# Patient Record
(demographics unavailable — no encounter records)

---

## 2025-03-06 NOTE — HISTORY OF PRESENT ILLNESS
[Spouse] : spouse [FreeTextEntry1] : Follow-up for atrial fibrillation, lung cancer, osteoporosis, hypertension, hyperlipidemia, anemia, and high glucose [de-identified] :  See "CC" sheet Occasional GERD, especially after eating restaurant food, salty food.    GERD  is better after taking Protonix 20 mg.  No nausea.  No vomiting.  No abdominal pain.  No melena.  No rectal bleeding.

## 2025-03-06 NOTE — ASSESSMENT
[FreeTextEntry1] : History of lung cancer.  Followed by oncologist (Jose) Atrial fibrillation.  Stable.  Followed by cardiologist (Laurel).  Continue Eliquis 5 mg twice daily.  Continue metoprolol 25 mg daily. Osteoporosis.  IV reclast in 6/2025.    DEXA in 12/25 Hypertension.  Continue losartan 25 mg daily. Hyperlipidemia.  Continue Crestor 5 mg daily.   Triglyceride, HDL, and LDL results from last week were reviewed with patient. CBC results from last week were reviewed with patient. Hemoglobin A1c result from last week was reviewed with patient. GERD.  Continue Protonix 20 mg once a day as needed.  EGD. Screening colonoscopy RX mammo ([facility]) COVID-19  booster vaccine is recommended once a year in fall. Patient understands Flu vaccine is once a year between month of October to November. Follow-up in 4 months

## 2025-03-06 NOTE — HEALTH RISK ASSESSMENT
Bupropion 150 mg 24 hr tab 1 tab by mouth daily  last filled 3/2/18  #90 1 rf  Pt last seen 8/2/18 for L elbow pain  Same meds  Keep scheduled follow up  Nov 8/15/18  Ok #90 0 rf  rx sent [facility] via e[facility]scribe   [Good] : ~his/her~  mood as  good [No] : In the past 12 months have you used drugs other than those required for medical reasons? No [No falls in past year] : Patient reported no falls in the past year [0] : 2) Feeling down, depressed, or hopeless: Not at all (0) [de-identified] : walk [de-identified] : low fat/salt [[id number]] : 0 [Yes] : Reviewed medication list for presence of high-risk medications. [Benzodiazepines] : benzodiazepines [Opioids] : opioids [Never] : Never [Patient declined mammogram] : Patient declined mammogram [Patient declined PAP Smear] : Patient declined PAP Smear [Patient reported bone density results were abnormal] : Patient reported bone density results were abnormal [Patient declined colonoscopy] : Patient declined colonoscopy [HIV test declined] : HIV test declined [Change in mental status noted] : No change in mental status noted [Language] : denies difficulty with language [Behavior] : denies difficulty with behavior [Learning/Retaining New Information] : denies difficulty learning/retaining new information [Handling Complex Tasks] : denies difficulty handling complex tasks [Reasoning] : denies difficulty with reasoning [Spatial Ability and Orientation] : denies difficulty with spatial ability and orientation [None] : None [With Significant Other] : lives with significant other [Retired] : retired [College] : College [] :  [Feels Safe at Home] : Feels safe at home [Fully functional (bathing, dressing, toileting, transferring, walking, feeding)] : Fully functional (bathing, dressing, toileting, transferring, walking, feeding) [Fully functional (using the telephone, shopping, preparing meals, housekeeping, doing laundry, using] : Fully functional and needs no help or supervision to perform IADLs (using the telephone, shopping, preparing meals, housekeeping, doing laundry, using transportation, managing medications and managing finances) [Smoke Detector] : smoke detector [Seat Belt] :  uses seat belt [Designated Healthcare Proxy] : Designated healthcare proxy [Name: ___] : Health Care Proxy's Name: [unfilled]  [Relationship: ___] : Relationship: [unfilled]

## 2025-03-06 NOTE — HISTORY OF PRESENT ILLNESS
[Spouse] : spouse [FreeTextEntry1] : Follow-up for atrial fibrillation, lung cancer, osteoporosis, hypertension, hyperlipidemia, anemia, and high glucose [de-identified] :  See "CC" sheet Occasional GERD, especially after eating restaurant food, salty food.    GERD  is better after taking Protonix 20 mg.  No nausea.  No vomiting.  No abdominal pain.  No melena.  No rectal bleeding.

## 2025-03-06 NOTE — HEALTH RISK ASSESSMENT
[Good] : ~his/her~  mood as  good [No] : In the past 12 months have you used drugs other than those required for medical reasons? No [No falls in past year] : Patient reported no falls in the past year [0] : 2) Feeling down, depressed, or hopeless: Not at all (0) [de-identified] : walk [de-identified] : low fat/salt [[id number]] : 0 [Yes] : Reviewed medication list for presence of high-risk medications. [Benzodiazepines] : benzodiazepines [Opioids] : opioids [Never] : Never [Patient declined mammogram] : Patient declined mammogram [Patient declined PAP Smear] : Patient declined PAP Smear [Patient reported bone density results were abnormal] : Patient reported bone density results were abnormal [Patient declined colonoscopy] : Patient declined colonoscopy [HIV test declined] : HIV test declined [Change in mental status noted] : No change in mental status noted [Language] : denies difficulty with language [Behavior] : denies difficulty with behavior [Learning/Retaining New Information] : denies difficulty learning/retaining new information [Handling Complex Tasks] : denies difficulty handling complex tasks [Reasoning] : denies difficulty with reasoning [Spatial Ability and Orientation] : denies difficulty with spatial ability and orientation [None] : None [With Significant Other] : lives with significant other [Retired] : retired [College] : College [] :  [Feels Safe at Home] : Feels safe at home [Fully functional (bathing, dressing, toileting, transferring, walking, feeding)] : Fully functional (bathing, dressing, toileting, transferring, walking, feeding) [Fully functional (using the telephone, shopping, preparing meals, housekeeping, doing laundry, using] : Fully functional and needs no help or supervision to perform IADLs (using the telephone, shopping, preparing meals, housekeeping, doing laundry, using transportation, managing medications and managing finances) [Smoke Detector] : smoke detector [Seat Belt] :  uses seat belt [Designated Healthcare Proxy] : Designated healthcare proxy [Name: ___] : Health Care Proxy's Name: [unfilled]  [Relationship: ___] : Relationship: [unfilled]

## 2025-07-03 NOTE — PHYSICAL EXAM
[No Acute Distress] : no acute distress [Well-Appearing] : well-appearing [Supple] : supple [No Respiratory Distress] : no respiratory distress  [No Accessory Muscle Use] : no accessory muscle use [Clear to Auscultation] : lungs were clear to auscultation bilaterally [Normal Gait] : normal gait [Speech Grossly Normal] : speech grossly normal [Normal Affect] : the affect was normal [Normal Mood] : the mood was normal

## 2025-07-03 NOTE — HISTORY OF PRESENT ILLNESS
[FreeTextEntry1] : Follow-up for lung cancer, atrial fibrillation, hypertension, hyperlipidemia, anemia, high glucose, and GERD [de-identified] :  See "intake form"

## 2025-07-03 NOTE — ASSESSMENT
[FreeTextEntry1] : History of lung cancer.  Followed by oncologist, Jose Atrial fibrillation.  Followed by cardiologist, La[place].  Continue Eliquis 5 mg twice daily.  Continue metoprolol 25 mg daily. Hypertension.  Continue losartan 25 mg daily. Hyperlipidemia.  Continue Crestor 5 mg daily.    Triglyceride, HDL, and LDL results from last week were reviewed with patient. Hemoglobin A1c result from last week was reviewed with patient. History of anemia.   CBC results from last week were reviewed with patient. Osteoporosis.   need IV reclast.  DEXA in 12/2025 GERD.  Stable.  Continue Protonix 20 mg daily. RX mammo ([facility]) Follow-up in 4 months COVID-19  booster vaccine is recommended once a year in fall.

## 2025-07-17 NOTE — HISTORY OF PRESENT ILLNESS
[FreeTextEntry1] : f/u with osteoporosis [de-identified] : Pt reports feeling well today. Kidney profile wnl.

## 2025-07-17 NOTE — HISTORY OF PRESENT ILLNESS
[FreeTextEntry1] : f/u with osteoporosis [de-identified] : Pt reports feeling well today. Kidney profile wnl.

## 2025-07-17 NOTE — PLAN
[FreeTextEntry1] : Repeat /58  Reclast 5mg/100 ml administered via right Antecubital  IV access and tolerated well. Keep well hydrated Pt warned.